# Patient Record
Sex: MALE | Race: WHITE | NOT HISPANIC OR LATINO | URBAN - METROPOLITAN AREA
[De-identification: names, ages, dates, MRNs, and addresses within clinical notes are randomized per-mention and may not be internally consistent; named-entity substitution may affect disease eponyms.]

---

## 2023-12-04 ENCOUNTER — TELEPHONE (OUTPATIENT)
Dept: CARDIOLOGY | Facility: CLINIC | Age: 73
End: 2023-12-04
Payer: COMMERCIAL

## 2023-12-04 NOTE — TELEPHONE ENCOUNTER
[employer] left message asking if his Rosuvastatin 5 mg was changed to 1 tablet 3 times a day.  If this is correct they would like office to send in new Rx sent in to [facility] pharmacy.    Please call [employer] [phone], ext [phone].    Routed to [facility].

## 2023-12-05 RX ORDER — CLONIDINE HYDROCHLORIDE 0.3 MG/1
0.3 TABLET ORAL 2 TIMES DAILY
COMMUNITY

## 2023-12-05 RX ORDER — NITROGLYCERIN 0.4 MG/1
0.4 TABLET SUBLINGUAL EVERY 5 MIN PRN
COMMUNITY

## 2023-12-05 RX ORDER — NAPROXEN SODIUM 220 MG/1
TABLET ORAL
COMMUNITY

## 2023-12-05 RX ORDER — ROSUVASTATIN CALCIUM 5 MG/1
5 TABLET, COATED ORAL EVERY OTHER DAY
COMMUNITY

## 2023-12-05 RX ORDER — INSULIN GLARGINE 100 [IU]/ML
INJECTION, SOLUTION SUBCUTANEOUS NIGHTLY
COMMUNITY

## 2023-12-05 RX ORDER — EZETIMIBE 10 MG/1
10 TABLET ORAL DAILY
COMMUNITY
End: 2024-02-01

## 2023-12-05 RX ORDER — FAMOTIDINE 40 MG/1
40 TABLET, FILM COATED ORAL DAILY
COMMUNITY

## 2023-12-05 RX ORDER — LABETALOL 300 MG/1
300 TABLET, FILM COATED ORAL 2 TIMES DAILY
COMMUNITY

## 2023-12-05 RX ORDER — CLOPIDOGREL BISULFATE 75 MG/1
75 TABLET ORAL DAILY
COMMUNITY

## 2023-12-05 RX ORDER — DAPAGLIFLOZIN 10 MG/1
10 TABLET, FILM COATED ORAL DAILY
COMMUNITY

## 2023-12-05 RX ORDER — LISINOPRIL AND HYDROCHLOROTHIAZIDE 10; 12.5 MG/1; MG/1
1 TABLET ORAL 2 TIMES DAILY
COMMUNITY

## 2023-12-05 RX ORDER — NIFEDIPINE 60 MG/1
60 TABLET, FILM COATED, EXTENDED RELEASE ORAL 2 TIMES DAILY
COMMUNITY

## 2023-12-05 RX ORDER — ASPIRIN 81 MG/1
81 TABLET ORAL DAILY
COMMUNITY

## 2023-12-05 NOTE — TELEPHONE ENCOUNTER
Patient states he is currently taking rosuvastatin 5 mg every other day due to symptoms and that Dr. Martinez Bailey MD was aware. Updated medication list to be corrected at next refills.

## 2024-01-29 DIAGNOSIS — E78.2 MIXED HYPERLIPIDEMIA: ICD-10-CM

## 2024-02-01 RX ORDER — EZETIMIBE 10 MG/1
10 TABLET ORAL NIGHTLY
Qty: 90 TABLET | Refills: 3 | Status: SHIPPED | OUTPATIENT
Start: 2024-02-01

## 2024-03-05 PROBLEM — E11.9 DIABETES MELLITUS (MULTI): Status: ACTIVE | Noted: 2024-03-05

## 2024-03-05 PROBLEM — R94.31 ABNORMAL EKG: Status: ACTIVE | Noted: 2024-03-05

## 2024-03-05 PROBLEM — Z98.61 HISTORY OF PTCA: Status: ACTIVE | Noted: 2024-03-05

## 2024-03-05 PROBLEM — K21.9 GERD (GASTROESOPHAGEAL REFLUX DISEASE): Status: ACTIVE | Noted: 2024-03-05

## 2024-03-05 PROBLEM — E78.5 HYPERLIPIDEMIA: Status: ACTIVE | Noted: 2024-03-05

## 2024-03-05 PROBLEM — I48.0 PAROXYSMAL ATRIAL FIBRILLATION (MULTI): Status: ACTIVE | Noted: 2024-03-05

## 2024-03-05 PROBLEM — I25.10 ATHEROSCLEROSIS OF NATIVE CORONARY ARTERY WITHOUT ANGINA PECTORIS: Status: ACTIVE | Noted: 2024-03-05

## 2024-03-05 PROBLEM — I10 BENIGN ESSENTIAL HYPERTENSION: Status: ACTIVE | Noted: 2024-03-05

## 2024-06-11 ENCOUNTER — OFFICE VISIT (OUTPATIENT)
Dept: CARDIOLOGY | Facility: CLINIC | Age: 74
End: 2024-06-11
Payer: COMMERCIAL

## 2024-06-11 VITALS
WEIGHT: 189.9 LBS | HEIGHT: 68 IN | SYSTOLIC BLOOD PRESSURE: 92 MMHG | HEART RATE: 58 BPM | BODY MASS INDEX: 28.78 KG/M2 | DIASTOLIC BLOOD PRESSURE: 54 MMHG

## 2024-06-11 DIAGNOSIS — I48.0 PAROXYSMAL ATRIAL FIBRILLATION (MULTI): Primary | ICD-10-CM

## 2024-06-11 DIAGNOSIS — Z98.61 HISTORY OF PTCA: ICD-10-CM

## 2024-06-11 DIAGNOSIS — I10 BENIGN ESSENTIAL HYPERTENSION: ICD-10-CM

## 2024-06-11 DIAGNOSIS — I25.118 CORONARY ARTERY DISEASE OF NATIVE ARTERY OF NATIVE HEART WITH STABLE ANGINA PECTORIS (CMS-HCC): ICD-10-CM

## 2024-06-11 DIAGNOSIS — Z87.891 FORMER CIGARETTE SMOKER: ICD-10-CM

## 2024-06-11 DIAGNOSIS — E78.2 MIXED HYPERLIPIDEMIA: ICD-10-CM

## 2024-06-11 PROCEDURE — 3074F SYST BP LT 130 MM HG: CPT | Performed by: INTERNAL MEDICINE

## 2024-06-11 PROCEDURE — 3078F DIAST BP <80 MM HG: CPT | Performed by: INTERNAL MEDICINE

## 2024-06-11 PROCEDURE — 1159F MED LIST DOCD IN RCRD: CPT | Performed by: INTERNAL MEDICINE

## 2024-06-11 PROCEDURE — 1036F TOBACCO NON-USER: CPT | Performed by: INTERNAL MEDICINE

## 2024-06-11 PROCEDURE — 99214 OFFICE O/P EST MOD 30 MIN: CPT | Performed by: INTERNAL MEDICINE

## 2024-06-11 RX ORDER — UBIDECARENONE 75 MG
500 CAPSULE ORAL DAILY
COMMUNITY

## 2024-06-11 RX ORDER — ISOSORBIDE MONONITRATE 30 MG/1
30 TABLET, EXTENDED RELEASE ORAL DAILY
Qty: 90 TABLET | Refills: 3 | Status: SHIPPED | OUTPATIENT
Start: 2024-06-11 | End: 2025-06-11

## 2024-06-11 NOTE — PROGRESS NOTES
"Mckenzie Jones is a 73 y.o. male       Chief Complaint    Annual Exam          HPI     Review of Systems   All other systems reviewed and are negative.     Patient returns in follow-up of problems as noted.  He states he is done well and has had no interval events.  He describes stable angina.  Detailed questioning ensued.  It sounds as if he has had stable angina for about 3 years.  It comes on her predictable workload and goes away with rest.  It will also go away promptly with a single nitroglycerin.  Prophylactic use of nitroglycerin prevents the episodes altogether.  I advised him this is all consistent with angina.    He recalls and/or remembers that he had coronary intervention about 4 years ago.  At that time there was a vessel that cannot be fixed and ever since his intervention he has basically had these anginal symptoms and because of this he presumes it to be on the basis of the vessel that was not amenable to PCI.  This is very possible.    He was educated on the concept of stable angina versus unstable angina.  He was advised that if he had any unstable symptomatology to seek emergent or prompt care.  We also suggested and offered the implementation of long-acting isosorbide mononitrate in hopes of attenuating his symptomatology and he will give it a try.  Otherwise he is happy and comfortable to continue as is.  He does not find the symptoms lifestyle limiting    In regards to other matters his blood pressure and lipids are adequately controlled.  He was congratulated on smoking cessation.  He has had no recent paroxysms of atrial fibrillation and because of this he will continue to monitor his rhythm and let us know if he develops any arrhythmia irregularities.  We did advocate the merits of weight loss as well.      Vitals:    06/11/24 0831   BP: 92/54   BP Location: Left arm   Patient Position: Sitting   Pulse: 58   Weight: 86.1 kg (189 lb 14.4 oz)   Height: 1.727 m (5' 8\")    "     Objective   Physical Exam  Constitutional:       Appearance: Normal appearance.   HENT:      Nose: Nose normal.   Neck:      Vascular: No carotid bruit.   Cardiovascular:      Rate and Rhythm: Normal rate.      Pulses: Normal pulses.      Heart sounds: Normal heart sounds.   Pulmonary:      Effort: Pulmonary effort is normal.   Abdominal:      General: Bowel sounds are normal.      Palpations: Abdomen is soft.   Musculoskeletal:         General: Normal range of motion.      Cervical back: Normal range of motion.      Right lower leg: No edema.      Left lower leg: No edema.   Skin:     General: Skin is warm and dry.   Neurological:      General: No focal deficit present.      Mental Status: He is alert.   Psychiatric:         Mood and Affect: Mood normal.         Behavior: Behavior normal.         Thought Content: Thought content normal.         Judgment: Judgment normal.         Allergies  Terbinafine     Current Medications    Current Outpatient Medications:     aspirin 81 mg EC tablet, Take 1 tablet (81 mg) by mouth once daily., Disp: , Rfl:     cloNIDine (Catapres) 0.3 mg tablet, Take 1 tablet (0.3 mg) by mouth once daily at bedtime., Disp: , Rfl:     clopidogrel (Plavix) 75 mg tablet, Take 1 tablet (75 mg) by mouth once daily., Disp: , Rfl:     cyanocobalamin (Vitamin B-12) 500 mcg tablet, Take 1 tablet (500 mcg) by mouth once daily., Disp: , Rfl:     dapagliflozin propanediol (Farxiga) 10 mg, Take 1 tablet (10 mg) by mouth once daily., Disp: , Rfl:     ezetimibe (Zetia) 10 mg tablet, TAKE 1 TABLET BY MOUTH AT BEDTIME, Disp: 90 tablet, Rfl: 3    insulin glargine (Basaglar KwikPen U-100 Insulin) 100 unit/mL (3 mL) pen, Inject under the skin once daily at bedtime. Take as directed per insulin instructions., Disp: , Rfl:     labetalol (Normodyne) 300 mg tablet, Take 1 tablet (300 mg) by mouth 2 times a day., Disp: , Rfl:     lisinopriL-hydrochlorothiazide 10-12.5 mg tablet, Take 1 tablet by mouth 2 times a  day., Disp: , Rfl:     NIFEdipine ER (Adalat CC) 60 mg 24 hr tablet, Take 1 tablet (60 mg) by mouth 2 times a day. Do not crush, chew, or split., Disp: , Rfl:     nitroglycerin (Nitrostat) 0.4 mg SL tablet, Place 1 tablet (0.4 mg) under the tongue every 5 minutes if needed for chest pain., Disp: , Rfl:     omega 3-dha-epa-fish oil (Fish OiL) 1,200 (144-216) mg capsule, Take by mouth., Disp: , Rfl:     rosuvastatin (Crestor) 5 mg tablet, Take 1 tablet (5 mg) by mouth. Take one tablet by mouth 3 times a week, Disp: , Rfl:                      Assessment/Plan   1. Paroxysmal atrial fibrillation (Multi)  No recurrence.  Patient understands how to watch and monitor for atrial fibrillation    2. History of PTCA  Patient had intervention about 4 years ago.  It improved his symptoms but not completely.  He has basically had stable angina ever since due to a vessel that cannot be intervened upon    3. Mixed hyperlipidemia  Review of treatment strategy demonstrates good control    4. Benign essential hypertension  Review of treatment strategy demonstrates good control    5. Coronary artery disease of native artery of native heart with stable angina pectoris (CMS-HCC)  As above, patient has stable coronary disease and stable angina pectoris.    6. Former cigarette smoker  Congratulated on cessation accomplishment           Scribe Attestation  By signing my name below, IYen LPN, Scribe   attest that this documentation has been prepared under the direction and in the presence of Martinez Bailey MD.     Provider Attestation - Scribe documentation    All medical record entries made by the Scribe were at my direction and personally dictated by me. I have reviewed the chart and agree that the record accurately reflects my personal performance of the history, physical exam, discussion and plan.

## 2024-06-11 NOTE — LETTER
June 11, 2024     Jad Davis MD  [address]  [facility] [address]    Patient: Abdelrahman Jones   YOB: 1950   Date of Visit: 6/11/2024       Dear Dr. Jad Davis MD:    Thank you for referring Abdelrahman Jones to me for evaluation. Below are my notes for this consultation.  If you have questions, please do not hesitate to call me. I look forward to following your patient along with you.       Sincerely,     Martinez Bailey MD      CC: No Recipients  ______________________________________________________________________________________    Subjective   Abdelrahman Jones is a 73 y.o. male       Chief Complaint    Annual Exam          HPI     Review of Systems   All other systems reviewed and are negative.     Patient returns in follow-up of problems as noted.  He states he is done well and has had no interval events.  He describes stable angina.  Detailed questioning ensued.  It sounds as if he has had stable angina for about 3 years.  It comes on her predictable workload and goes away with rest.  It will also go away promptly with a single nitroglycerin.  Prophylactic use of nitroglycerin prevents the episodes altogether.  I advised him this is all consistent with angina.    He recalls and/or remembers that he had coronary intervention about 4 years ago.  At that time there was a vessel that cannot be fixed and ever since his intervention he has basically had these anginal symptoms and because of this he presumes it to be on the basis of the vessel that was not amenable to PCI.  This is very possible.    He was educated on the concept of stable angina versus unstable angina.  He was advised that if he had any unstable symptomatology to seek emergent or prompt care.  We also suggested and offered the implementation of long-acting isosorbide mononitrate in hopes of attenuating his symptomatology and he will give it a try.  Otherwise he is happy and comfortable to continue as is.  He does  "not find the symptoms lifestyle limiting    In regards to other matters his blood pressure and lipids are adequately controlled.  He was congratulated on smoking cessation.  He has had no recent paroxysms of atrial fibrillation and because of this he will continue to monitor his rhythm and let us know if he develops any arrhythmia irregularities.  We did advocate the merits of weight loss as well.      Vitals:    06/11/24 0831   BP: 92/54   BP Location: Left arm   Patient Position: Sitting   Pulse: 58   Weight: 86.1 kg (189 lb 14.4 oz)   Height: 1.727 m (5' 8\")        Objective   Physical Exam  Constitutional:       Appearance: Normal appearance.   HENT:      Nose: Nose normal.   Neck:      Vascular: No carotid bruit.   Cardiovascular:      Rate and Rhythm: Normal rate.      Pulses: Normal pulses.      Heart sounds: Normal heart sounds.   Pulmonary:      Effort: Pulmonary effort is normal.   Abdominal:      General: Bowel sounds are normal.      Palpations: Abdomen is soft.   Musculoskeletal:         General: Normal range of motion.      Cervical back: Normal range of motion.      Right lower leg: No edema.      Left lower leg: No edema.   Skin:     General: Skin is warm and dry.   Neurological:      General: No focal deficit present.      Mental Status: He is alert.   Psychiatric:         Mood and Affect: Mood normal.         Behavior: Behavior normal.         Thought Content: Thought content normal.         Judgment: Judgment normal.         Allergies  Terbinafine     Current Medications    Current Outpatient Medications:   •  aspirin 81 mg EC tablet, Take 1 tablet (81 mg) by mouth once daily., Disp: , Rfl:   •  cloNIDine (Catapres) 0.3 mg tablet, Take 1 tablet (0.3 mg) by mouth once daily at bedtime., Disp: , Rfl:   •  clopidogrel (Plavix) 75 mg tablet, Take 1 tablet (75 mg) by mouth once daily., Disp: , Rfl:   •  cyanocobalamin (Vitamin B-12) 500 mcg tablet, Take 1 tablet (500 mcg) by mouth once daily., Disp: , " Rfl:   •  dapagliflozin propanediol (Farxiga) 10 mg, Take 1 tablet (10 mg) by mouth once daily., Disp: , Rfl:   •  ezetimibe (Zetia) 10 mg tablet, TAKE 1 TABLET BY MOUTH AT BEDTIME, Disp: 90 tablet, Rfl: 3  •  insulin glargine (Basaglar KwikPen U-100 Insulin) 100 unit/mL (3 mL) pen, Inject under the skin once daily at bedtime. Take as directed per insulin instructions., Disp: , Rfl:   •  labetalol (Normodyne) 300 mg tablet, Take 1 tablet (300 mg) by mouth 2 times a day., Disp: , Rfl:   •  lisinopriL-hydrochlorothiazide 10-12.5 mg tablet, Take 1 tablet by mouth 2 times a day., Disp: , Rfl:   •  NIFEdipine ER (Adalat CC) 60 mg 24 hr tablet, Take 1 tablet (60 mg) by mouth 2 times a day. Do not crush, chew, or split., Disp: , Rfl:   •  nitroglycerin (Nitrostat) 0.4 mg SL tablet, Place 1 tablet (0.4 mg) under the tongue every 5 minutes if needed for chest pain., Disp: , Rfl:   •  omega 3-dha-epa-fish oil (Fish OiL) 1,200 (144-216) mg capsule, Take by mouth., Disp: , Rfl:   •  rosuvastatin (Crestor) 5 mg tablet, Take 1 tablet (5 mg) by mouth. Take one tablet by mouth 3 times a week, Disp: , Rfl:                      Assessment/Plan   1. Paroxysmal atrial fibrillation (Multi)  No recurrence.  Patient understands how to watch and monitor for atrial fibrillation    2. History of PTCA  Patient had intervention about 4 years ago.  It improved his symptoms but not completely.  He has basically had stable angina ever since due to a vessel that cannot be intervened upon    3. Mixed hyperlipidemia  Review of treatment strategy demonstrates good control    4. Benign essential hypertension  Review of treatment strategy demonstrates good control    5. Coronary artery disease of native artery of native heart with stable angina pectoris (CMS-HCC)  As above, patient has stable coronary disease and stable angina pectoris.    6. Former cigarette smoker  Congratulated on cessation accomplishment           Scribe Attestation  By signing my  name below, I, David Sepulveda LPN   attest that this documentation has been prepared under the direction and in the presence of Martinez Bailey MD.     Provider Attestation - Scribe documentation    All medical record entries made by the Scribe were at my direction and personally dictated by me. I have reviewed the chart and agree that the record accurately reflects my personal performance of the history, physical exam, discussion and plan.

## 2024-06-11 NOTE — PATIENT INSTRUCTIONS
Please bring all medicines, vitamins, and herbal supplements with you when you come to the office.    Prescriptions will not be filled unless you are compliant with your follow up appointments or have a follow up appointment scheduled as per instruction of your physician. Refills should be requested at the time of your visit.     BMI was above normal measurement. Current weight: 86.1 kg (189 lb 14.4 oz)  Weight change since last visit (-) denotes wt loss -0.1 lbs   Weight loss needed to achieve BMI 25: 25.8 Lbs  Weight loss needed to achieve BMI 30: -7 Lbs  Provided instructions on dietary changes  Provided instructions on exercise.

## 2024-07-02 DIAGNOSIS — E78.2 MIXED HYPERLIPIDEMIA: ICD-10-CM

## 2024-07-03 RX ORDER — ROSUVASTATIN CALCIUM 5 MG/1
5 TABLET, COATED ORAL NIGHTLY
Qty: 90 TABLET | Refills: 3 | Status: SHIPPED | OUTPATIENT
Start: 2024-07-03

## 2025-01-09 ENCOUNTER — APPOINTMENT (OUTPATIENT)
Dept: CARDIOLOGY | Facility: CLINIC | Age: 75
End: 2025-01-09
Payer: COMMERCIAL

## 2025-01-09 VITALS
SYSTOLIC BLOOD PRESSURE: 128 MMHG | DIASTOLIC BLOOD PRESSURE: 64 MMHG | WEIGHT: 184.6 LBS | BODY MASS INDEX: 27.98 KG/M2 | HEART RATE: 58 BPM | HEIGHT: 68 IN

## 2025-01-09 DIAGNOSIS — Z98.61 HISTORY OF PTCA: ICD-10-CM

## 2025-01-09 DIAGNOSIS — I25.10 ASHD (ARTERIOSCLEROTIC HEART DISEASE): ICD-10-CM

## 2025-01-09 DIAGNOSIS — I25.118 CORONARY ARTERY DISEASE OF NATIVE ARTERY OF NATIVE HEART WITH STABLE ANGINA PECTORIS: ICD-10-CM

## 2025-01-09 DIAGNOSIS — I48.0 PAROXYSMAL ATRIAL FIBRILLATION (MULTI): ICD-10-CM

## 2025-01-09 DIAGNOSIS — E78.2 MIXED HYPERLIPIDEMIA: ICD-10-CM

## 2025-01-09 DIAGNOSIS — Z87.891 FORMER CIGARETTE SMOKER: ICD-10-CM

## 2025-01-09 DIAGNOSIS — I20.9 ANGINA, CLASS II (CMS-HCC): ICD-10-CM

## 2025-01-09 DIAGNOSIS — I10 ESSENTIAL HYPERTENSION: ICD-10-CM

## 2025-01-09 PROCEDURE — 1159F MED LIST DOCD IN RCRD: CPT | Performed by: INTERNAL MEDICINE

## 2025-01-09 PROCEDURE — G2211 COMPLEX E/M VISIT ADD ON: HCPCS | Performed by: INTERNAL MEDICINE

## 2025-01-09 PROCEDURE — 3078F DIAST BP <80 MM HG: CPT | Performed by: INTERNAL MEDICINE

## 2025-01-09 PROCEDURE — 1036F TOBACCO NON-USER: CPT | Performed by: INTERNAL MEDICINE

## 2025-01-09 PROCEDURE — 1160F RVW MEDS BY RX/DR IN RCRD: CPT | Performed by: INTERNAL MEDICINE

## 2025-01-09 PROCEDURE — 3008F BODY MASS INDEX DOCD: CPT | Performed by: INTERNAL MEDICINE

## 2025-01-09 PROCEDURE — 99214 OFFICE O/P EST MOD 30 MIN: CPT | Performed by: INTERNAL MEDICINE

## 2025-01-09 PROCEDURE — 3074F SYST BP LT 130 MM HG: CPT | Performed by: INTERNAL MEDICINE

## 2025-01-09 RX ORDER — POLYETHYLENE GLYCOL 3350 17 G/17G
17 POWDER, FOR SOLUTION ORAL DAILY
COMMUNITY

## 2025-01-09 RX ORDER — NIFEDIPINE 30 MG/1
30 TABLET, EXTENDED RELEASE ORAL DAILY
COMMUNITY
Start: 2024-12-30

## 2025-01-09 NOTE — LETTER
January 9, 2025     Jad Davis MD  [address]  [facility] [address]    Patient: Abdelrahman Jones   YOB: 1950   Date of Visit: 1/9/2025       Dear Dr. Jad Davis MD:    Thank you for referring Abdelrahman Jones to me for evaluation. Below are my notes for this consultation.  If you have questions, please do not hesitate to call me. I look forward to following your patient along with you.       Sincerely,     Martinez Santamaria, DO      CC: No Recipients  ______________________________________________________________________________________    Subjective   Abdelrahman Jones is a 74 y.o. male       Chief Complaint    Follow-up          74-year-old gentleman returns for follow-up, former patient of Dr. Bailey's now I am assuming his cardiovascular care during his [facility].  He is doing reasonably well, he does have class II angina with exertion, notably at approximately 8 to 10 minutes on the treadmill, relieved with rest and occasionally using nitroglycerin.  This anginal complaint has not changed over the past several years.    He has known ASHD with multivessel revascularization of the LAD, proximal RCA performed by myself prior to 2019 subsequently in 2019, had ACS event and underwent PCI of the PLV branch; at that time LAD and RCA stents were widely patent.  Notably he has subtotal occlusion distal circumflex that we have avoided, and treated conservatively (this is likely is his culprit vessel).    He remains on DAPT, long-acting nitrates as well as calcium antagonist, low-dose statin including Zetia, as well as labetalol, clonidine and nifedipine.    His diabetes is under reasonable control his last A1c was 6.6%, last LDL from April 2024 was 37.    As per Dr. Bailey's last note as well as today we reiterated stable versus unstable anginal complaints and went to proceed with further urgent/emergent evaluation if necessary.  He is clearly stable and I would grade his angina  "classification II.    Detailed review of last stress imaging, last angiogram and intervention from 2019 as well as Dr. Bailey's clinical notes.    Recommendations at this time are to proceed with stress perfusion imaging within the next 6 months, continue current therapies, obtain lipid panel and high-sensitivity CRP and follow-up in 6 months         Review of Systems   All other systems reviewed and are negative.           Vitals:    01/09/25 1319   BP: 128/64   BP Location: Left arm   Patient Position: Sitting   Pulse: 58   Weight: 83.7 kg (184 lb 9.6 oz)   Height: 1.727 m (5' 8\")        Objective   Physical Exam  Constitutional:       Appearance: Normal appearance.   HENT:      Nose: Nose normal.   Neck:      Vascular: No carotid bruit.   Cardiovascular:      Rate and Rhythm: Normal rate.      Pulses: Normal pulses.      Heart sounds: Normal heart sounds.   Pulmonary:      Effort: Pulmonary effort is normal.   Abdominal:      General: Bowel sounds are normal.      Palpations: Abdomen is soft.   Musculoskeletal:         General: Normal range of motion.      Cervical back: Normal range of motion.      Right lower leg: No edema.      Left lower leg: No edema.   Skin:     General: Skin is warm and dry.   Neurological:      General: No focal deficit present.      Mental Status: He is alert.   Psychiatric:         Mood and Affect: Mood normal.         Behavior: Behavior normal.         Thought Content: Thought content normal.         Judgment: Judgment normal.         Allergies  Terbinafine     Current Medications    Current Outpatient Medications:   •  aspirin 81 mg EC tablet, Take 1 tablet (81 mg) by mouth once daily., Disp: , Rfl:   •  cloNIDine (Catapres) 0.3 mg tablet, Take 1 tablet (0.3 mg) by mouth once daily at bedtime., Disp: , Rfl:   •  clopidogrel (Plavix) 75 mg tablet, Take 1 tablet (75 mg) by mouth once daily., Disp: , Rfl:   •  cyanocobalamin (Vitamin B-12) 500 mcg tablet, Take 1 tablet (500 mcg) by " mouth once daily., Disp: , Rfl:   •  dapagliflozin propanediol (Farxiga) 10 mg, Take 1 tablet (10 mg) by mouth once daily., Disp: , Rfl:   •  ezetimibe (Zetia) 10 mg tablet, TAKE 1 TABLET BY MOUTH AT BEDTIME, Disp: 90 tablet, Rfl: 3  •  insulin glargine (Basaglar KwikPen U-100 Insulin) 100 unit/mL (3 mL) pen, Inject under the skin once daily at bedtime. Take as directed per insulin instructions., Disp: , Rfl:   •  isosorbide mononitrate ER (Imdur) 30 mg 24 hr tablet, Take 1 tablet (30 mg) by mouth once daily. Do not crush or chew., Disp: 90 tablet, Rfl: 3  •  labetalol (Normodyne) 300 mg tablet, Take 1 tablet (300 mg) by mouth 2 times a day., Disp: , Rfl:   •  lisinopriL-hydrochlorothiazide 10-12.5 mg tablet, Take 1 tablet by mouth 2 times a day., Disp: , Rfl:   •  NIFEdipine CC 30 mg 24 hr tablet, Take 1 tablet (30 mg) by mouth once daily. Do not crush, chew, or split, Disp: , Rfl:   •  nitroglycerin (Nitrostat) 0.4 mg SL tablet, Place 1 tablet (0.4 mg) under the tongue every 5 minutes if needed for chest pain., Disp: , Rfl:   •  omega 3-dha-epa-fish oil (Fish OiL) 1,200 (144-216) mg capsule, Take by mouth., Disp: , Rfl:   •  polyethylene glycol (Glycolax, Miralax) 17 gram packet, Take 17 g by mouth once daily., Disp: , Rfl:   •  rosuvastatin (Crestor) 5 mg tablet, TAKE 1 TABLET BY MOUTH AT BEDTIME, Disp: 90 tablet, Rfl: 3                     Assessment/Plan   1. Coronary artery disease of native artery of native heart with stable angina pectoris  Follow Up In Cardiology      2. History of PTCA        3. Paroxysmal atrial fibrillation (Multi)        4. Essential hypertension        5. Mixed hyperlipidemia        6. Former cigarette smoker        7. Angina, class II (CMS-HCC)                 Scribe Attestation  By signing my name below, Tari NGUYEN LPN  , Scribe   attest that this documentation has been prepared under the direction and in the presence of Martinez Santamaria DO.     Provider Attestation - Scribe  documentation    All medical record entries made by the Scribe were at my direction and personally dictated by me. I have reviewed the chart and agree that the record accurately reflects my personal performance of the history, physical exam, discussion and plan.

## 2025-01-09 NOTE — PATIENT INSTRUCTIONS
Please bring all medicines, vitamins, and herbal supplements with you when you come to the office.    Prescriptions will not be filled unless you are compliant with your follow up appointments or have a follow up appointment scheduled as per instruction of your physician. Refills should be requested at the time of your visit.     BMI was above normal measurement. Current weight: 83.7 kg (184 lb 9.6 oz)  Weight change since last visit (-) denotes wt loss -5.3 lbs   Weight loss needed to achieve BMI 25: 20.5 Lbs  Weight loss needed to achieve BMI 30: -12.3 Lbs  Provided instructions on dietary changes  Provided instructions on exercise.

## 2025-01-09 NOTE — PROGRESS NOTES
Subjective   Abdelrahman Jones is a 74 y.o. male       Chief Complaint    Follow-up          74-year-old gentleman returns for follow-up, former patient of Dr. Bailey's now I am assuming his cardiovascular care during his intermediate.  He is doing reasonably well, he does have class II angina with exertion, notably at approximately 8 to 10 minutes on the treadmill, relieved with rest and occasionally using nitroglycerin.  This anginal complaint has not changed over the past several years.    He has known ASHD with multivessel revascularization of the LAD, proximal RCA performed by myself prior to 2019 subsequently in 2019, had ACS event and underwent PCI of the PLV branch; at that time LAD and RCA stents were widely patent.  Notably he has subtotal occlusion distal circumflex that we have avoided, and treated conservatively (this is likely is his culprit vessel).    He remains on DAPT, long-acting nitrates as well as calcium antagonist, low-dose statin including Zetia, as well as labetalol, clonidine and nifedipine.    His diabetes is under reasonable control his last A1c was 6.6%, last LDL from April 2024 was 37.    As per Dr. Bailey's last note as well as today we reiterated stable versus unstable anginal complaints and went to proceed with further urgent/emergent evaluation if necessary.  He is clearly stable and I would grade his angina classification II.    Detailed review of last stress imaging, last angiogram and intervention from 2019 as well as Dr. Bailey's clinical notes.    Recommendations at this time are to proceed with stress perfusion imaging within the next 6 months, continue current therapies, obtain lipid panel and high-sensitivity CRP and follow-up in 6 months         Review of Systems   All other systems reviewed and are negative.           Vitals:    01/09/25 1319   BP: 128/64   BP Location: Left arm   Patient Position: Sitting   Pulse: 58   Weight: 83.7 kg (184 lb 9.6 oz)   Height: 1.727 m (5'  "8\")        Objective   Physical Exam  Constitutional:       Appearance: Normal appearance.   HENT:      Nose: Nose normal.   Neck:      Vascular: No carotid bruit.   Cardiovascular:      Rate and Rhythm: Normal rate.      Pulses: Normal pulses.      Heart sounds: Normal heart sounds.   Pulmonary:      Effort: Pulmonary effort is normal.   Abdominal:      General: Bowel sounds are normal.      Palpations: Abdomen is soft.   Musculoskeletal:         General: Normal range of motion.      Cervical back: Normal range of motion.      Right lower leg: No edema.      Left lower leg: No edema.   Skin:     General: Skin is warm and dry.   Neurological:      General: No focal deficit present.      Mental Status: He is alert.   Psychiatric:         Mood and Affect: Mood normal.         Behavior: Behavior normal.         Thought Content: Thought content normal.         Judgment: Judgment normal.         Allergies  Terbinafine     Current Medications    Current Outpatient Medications:     aspirin 81 mg EC tablet, Take 1 tablet (81 mg) by mouth once daily., Disp: , Rfl:     cloNIDine (Catapres) 0.3 mg tablet, Take 1 tablet (0.3 mg) by mouth once daily at bedtime., Disp: , Rfl:     clopidogrel (Plavix) 75 mg tablet, Take 1 tablet (75 mg) by mouth once daily., Disp: , Rfl:     cyanocobalamin (Vitamin B-12) 500 mcg tablet, Take 1 tablet (500 mcg) by mouth once daily., Disp: , Rfl:     dapagliflozin propanediol (Farxiga) 10 mg, Take 1 tablet (10 mg) by mouth once daily., Disp: , Rfl:     ezetimibe (Zetia) 10 mg tablet, TAKE 1 TABLET BY MOUTH AT BEDTIME, Disp: 90 tablet, Rfl: 3    insulin glargine (Basaglar KwikPen U-100 Insulin) 100 unit/mL (3 mL) pen, Inject under the skin once daily at bedtime. Take as directed per insulin instructions., Disp: , Rfl:     isosorbide mononitrate ER (Imdur) 30 mg 24 hr tablet, Take 1 tablet (30 mg) by mouth once daily. Do not crush or chew., Disp: 90 tablet, Rfl: 3    labetalol (Normodyne) 300 mg " tablet, Take 1 tablet (300 mg) by mouth 2 times a day., Disp: , Rfl:     lisinopriL-hydrochlorothiazide 10-12.5 mg tablet, Take 1 tablet by mouth 2 times a day., Disp: , Rfl:     NIFEdipine CC 30 mg 24 hr tablet, Take 1 tablet (30 mg) by mouth once daily. Do not crush, chew, or split, Disp: , Rfl:     nitroglycerin (Nitrostat) 0.4 mg SL tablet, Place 1 tablet (0.4 mg) under the tongue every 5 minutes if needed for chest pain., Disp: , Rfl:     omega 3-dha-epa-fish oil (Fish OiL) 1,200 (144-216) mg capsule, Take by mouth., Disp: , Rfl:     polyethylene glycol (Glycolax, Miralax) 17 gram packet, Take 17 g by mouth once daily., Disp: , Rfl:     rosuvastatin (Crestor) 5 mg tablet, TAKE 1 TABLET BY MOUTH AT BEDTIME, Disp: 90 tablet, Rfl: 3                     Assessment/Plan   1. Coronary artery disease of native artery of native heart with stable angina pectoris  Follow Up In Cardiology      2. History of PTCA        3. Paroxysmal atrial fibrillation (Multi)        4. Essential hypertension        5. Mixed hyperlipidemia        6. Former cigarette smoker        7. Angina, class II (CMS-HCC)                 Scribe Attestation  By signing my name below, ITari LPN, Scribe   attest that this documentation has been prepared under the direction and in the presence of Martinez Santamaria DO.     Provider Attestation - Scribe documentation    All medical record entries made by the Scribe were at my direction and personally dictated by me. I have reviewed the chart and agree that the record accurately reflects my personal performance of the history, physical exam, discussion and plan.

## 2025-01-29 ENCOUNTER — HOSPITAL ENCOUNTER (OUTPATIENT)
Dept: RADIOLOGY | Facility: CLINIC | Age: 75
Discharge: HOME | End: 2025-01-29
Payer: COMMERCIAL

## 2025-01-29 ENCOUNTER — HOSPITAL ENCOUNTER (OUTPATIENT)
Dept: CARDIOLOGY | Facility: CLINIC | Age: 75
Discharge: HOME | End: 2025-01-29
Payer: COMMERCIAL

## 2025-01-29 VITALS — HEART RATE: 64 BPM | SYSTOLIC BLOOD PRESSURE: 138 MMHG | DIASTOLIC BLOOD PRESSURE: 78 MMHG

## 2025-01-29 DIAGNOSIS — I20.9 ANGINA, CLASS II (CMS-HCC): ICD-10-CM

## 2025-01-29 DIAGNOSIS — Z98.61 HISTORY OF PTCA: ICD-10-CM

## 2025-01-29 DIAGNOSIS — I25.118 CORONARY ARTERY DISEASE OF NATIVE ARTERY OF NATIVE HEART WITH STABLE ANGINA PECTORIS: ICD-10-CM

## 2025-01-29 DIAGNOSIS — I10 ESSENTIAL HYPERTENSION: ICD-10-CM

## 2025-01-29 DIAGNOSIS — Z87.891 FORMER CIGARETTE SMOKER: ICD-10-CM

## 2025-01-29 PROCEDURE — A9502 TC99M TETROFOSMIN: HCPCS | Performed by: INTERNAL MEDICINE

## 2025-01-29 PROCEDURE — 78452 HT MUSCLE IMAGE SPECT MULT: CPT | Performed by: INTERNAL MEDICINE

## 2025-01-29 PROCEDURE — 93018 CV STRESS TEST I&R ONLY: CPT | Performed by: INTERNAL MEDICINE

## 2025-01-29 PROCEDURE — 3430000001 HC RX 343 DIAGNOSTIC RADIOPHARMACEUTICALS: Performed by: INTERNAL MEDICINE

## 2025-01-29 PROCEDURE — 78452 HT MUSCLE IMAGE SPECT MULT: CPT

## 2025-01-29 PROCEDURE — 93017 CV STRESS TEST TRACING ONLY: CPT

## 2025-01-29 PROCEDURE — 93016 CV STRESS TEST SUPVJ ONLY: CPT | Performed by: INTERNAL MEDICINE

## 2025-01-29 PROCEDURE — 2500000004 HC RX 250 GENERAL PHARMACY W/ HCPCS (ALT 636 FOR OP/ED): Performed by: INTERNAL MEDICINE

## 2025-01-29 RX ORDER — REGADENOSON 0.08 MG/ML
0.4 INJECTION, SOLUTION INTRAVENOUS ONCE
Status: COMPLETED | OUTPATIENT
Start: 2025-01-29 | End: 2025-01-29

## 2025-01-29 RX ADMIN — REGADENOSON 0.4 MG: 0.08 INJECTION, SOLUTION INTRAVENOUS at 09:40

## 2025-01-29 RX ADMIN — TETROFOSMIN 34.4 MILLICURIE: 0.23 INJECTION, POWDER, LYOPHILIZED, FOR SOLUTION INTRAVENOUS at 09:41

## 2025-01-29 RX ADMIN — TETROFOSMIN 11.1 MILLICURIE: 0.23 INJECTION, POWDER, LYOPHILIZED, FOR SOLUTION INTRAVENOUS at 08:28

## 2025-01-30 ENCOUNTER — TELEPHONE (OUTPATIENT)
Dept: CARDIOLOGY | Facility: CLINIC | Age: 75
End: 2025-01-30
Payer: COMMERCIAL

## 2025-01-30 NOTE — TELEPHONE ENCOUNTER
Result Communication    Resulted Orders   Nuclear Stress Test    Narrative    Interpreted By:  Kenneth Hinton and Giannuzzi Michael   STUDY:  MYOCARDIAL PERFUSION STRESS TEST WITH EXERCISE CONVERTED TO LEXISCAN      Performing facility:  [facility],  [address] [place] [address], [address],  [place] [address]  [facility] Provider:  JOYCE Santamaria DO, FACC  PCP:  Dr. SHERITA Davis  Supervising provider:  JOYCE Santamaria DO, FACC      INDICATION:  CAD;  Hx PTCA  HTN  Angina pectoris      HISTORY:  Gender:  M; Age:  75 y/o ; Height:  .7 cm cm; Weight:   WT  83.734 kg kg.      CAD;  High Cholesterol;  HTN;  Arrhythmias; A-fib  Chest Pain;  Quit smoking 47 years ago.      Cardiac catheterization on 2017, 2018, 2019.  PTCA on 2017, 2018,2019.      COMPARISON:  Previous nuclear testing completed on2021 at [facility].          ACCESSION NUMBER(S):  [id number]      ORDERING CLINICIAN:  GENEVA SANTAMARIA      TECHNIQUE:  ONE DAY protocol.  Stress injection: Date:  1-29-25, 34.4mCii of Myoview IV at 20  seconds after rapid injection of Lexiscan. Rest injection: Date:  1-29-25, 11.1mCi of Myoview IV at rest. The patient had a rapid  injection of 0.4mg of Lexiscan IV over 10 seconds. Imaging was  performed by  gated tomographic technique.          STRESS TEST DATA:  Resting heart rate was 64 BPM.  Resting blood pressure was 138/78 mmHg.  The patient exercised using a  Cooper exercise protocol.  6:46 minutes exercised.  80% of MPHR achieved for age.  8.10 METS achieved.  Maximum heart rate was 118 BPM.  Maximum blood pressure was 164/78 mmHg.  DTS 6.      TREADMILL TEST TERMINATED DUE TO: Dyspnea, leg weakness.  Test converted to Lexiscan.      TEST TERMINATED DUE TO:  Protocol completed.      FINDINGS:  STRESS TEST RESULTS:      Resting electrocardiogram revealed normal sinus rhythm nonspecific  ST-T changes. The patient had none diagnostic ECG changes with  maximal stress. The patient did not have chest  pains/symptoms during  the procedure. There was a normal recovery phase.  There were no significant dysrhythmias.  Patient did not achieve 85% MPHR so test was immediately converted to  Lexiscan.      LEXISCAN INFUSION:      The patient had a rapid injection of 0.4 mg of Lexiscan IV over 10  seconds. Resting electrocardiogram revealed normal sinus rhythm with  nonspecific ST-T changes. The patient had no significant ECG changes  with maximal stress. The patient did not have chest pains/symptoms  during the procedure. There was a normal recovery phase.  There were no significant dysrhythmias.      IMAGING RESULTS:      Image quality was good.  Rest and stress tomographic images were reviewed and revealed normal  perfusion without evidence of ischemia, myocardial infarction, or  left ventricular dilatation with stress. Overall left ventricular  systolic function appeared to be normal without regional wall motion  abnormalities. LV ejection fraction was 59 %.  TID is 1.2 and is normal.  There were evidence of diaphragmatic attenuation artifact.        Impression    Normal Lexiscan Myoview cardiac perfusion imaging stress test.  No evidence of ischemia or myocardial infarction by perfusion imaging.  Normal left ventricular systolic function, ejection fraction 59 %.  No exercise provoked significant ischemic ECG changes or chest pain  symptoms. Patient was able to exercise for 6 minute 46 seconds  achieving only 80% of maximum predicted heart rate and workload of  9.1 Mets. Because of sub optimal heart rate response to exercise the  patient was switched to Lexiscan to improve the sensitivity of the  test. No change when compared to previous study          Signed by: Kenneth Hinton 1/29/2025 5:57 PM  Dictation workstation:   [id number]       11:06 AM      Results were successfully communicated with the patient and they acknowledged their understanding.

## 2025-01-30 NOTE — TELEPHONE ENCOUNTER
----- Message from Martinez Santamaria sent at 1/30/2025 10:50 AM EST -----  No new orders. Please call patient with normal result.

## 2025-04-25 LAB
NON-UH HIE ALANINE AMINOTRANSFERASE: 25 U/L (ref 7–52)
NON-UH HIE ASPARTATE AMINO TRANSFERASE: 22 U/L (ref 13–39)
NON-UH HIE CHOL/HDL RATIO: 2.4
NON-UH HIE CHOLESTEROL: 87 MG/DL (ref 140–200)
NON-UH HIE HDL CHOLESTEROL: 36 MG/DL (ref 23–92)
NON-UH HIE HIGH SENSITIVE CRP: 3.5 MG/L (ref 0–0.9)
NON-UH HIE LDL CHOLESTEROL,CALCULATED: 21 MG/DL (ref 0–100)
NON-UH HIE TRIGLYCERIDE W/REFLEX: 151 MG/DL (ref 0–149)
NON-UH HIE VLDL CHOLESTEROL: 30 MG/DL

## 2025-07-09 ENCOUNTER — APPOINTMENT (OUTPATIENT)
Dept: CARDIOLOGY | Facility: CLINIC | Age: 75
End: 2025-07-09
Payer: COMMERCIAL

## 2025-07-09 VITALS
DIASTOLIC BLOOD PRESSURE: 51 MMHG | BODY MASS INDEX: 28.19 KG/M2 | SYSTOLIC BLOOD PRESSURE: 112 MMHG | HEART RATE: 60 BPM | WEIGHT: 186 LBS | HEIGHT: 68 IN

## 2025-07-09 DIAGNOSIS — Z87.891 FORMER CIGARETTE SMOKER: ICD-10-CM

## 2025-07-09 DIAGNOSIS — I48.0 PAROXYSMAL ATRIAL FIBRILLATION (MULTI): ICD-10-CM

## 2025-07-09 DIAGNOSIS — I20.9 ANGINA, CLASS II: ICD-10-CM

## 2025-07-09 DIAGNOSIS — Z98.61 HISTORY OF PTCA: ICD-10-CM

## 2025-07-09 DIAGNOSIS — I10 ESSENTIAL HYPERTENSION: ICD-10-CM

## 2025-07-09 DIAGNOSIS — I25.118 CORONARY ARTERY DISEASE OF NATIVE ARTERY OF NATIVE HEART WITH STABLE ANGINA PECTORIS: ICD-10-CM

## 2025-07-09 DIAGNOSIS — E78.2 MIXED HYPERLIPIDEMIA: ICD-10-CM

## 2025-07-09 DIAGNOSIS — E11.9 TYPE 2 DIABETES MELLITUS WITHOUT COMPLICATION, UNSPECIFIED WHETHER LONG TERM INSULIN USE: ICD-10-CM

## 2025-07-09 PROCEDURE — 3074F SYST BP LT 130 MM HG: CPT | Performed by: INTERNAL MEDICINE

## 2025-07-09 PROCEDURE — 1036F TOBACCO NON-USER: CPT | Performed by: INTERNAL MEDICINE

## 2025-07-09 PROCEDURE — 1160F RVW MEDS BY RX/DR IN RCRD: CPT | Performed by: INTERNAL MEDICINE

## 2025-07-09 PROCEDURE — 99214 OFFICE O/P EST MOD 30 MIN: CPT | Performed by: INTERNAL MEDICINE

## 2025-07-09 PROCEDURE — 1159F MED LIST DOCD IN RCRD: CPT | Performed by: INTERNAL MEDICINE

## 2025-07-09 PROCEDURE — 3078F DIAST BP <80 MM HG: CPT | Performed by: INTERNAL MEDICINE

## 2025-07-09 PROCEDURE — 3008F BODY MASS INDEX DOCD: CPT | Performed by: INTERNAL MEDICINE

## 2025-07-09 NOTE — PROGRESS NOTES
"Chief Complaint   Patient presents with    Follow-up     6 monthCoronary artery disease of native artery of native heart with stable angina pectoris       Mckenzie Jones is a 74 y.o. male     74-year-old gentleman returns for annual cardiovascular follow-up and is doing well, he denies any cardiovascular events, complaints or nitrate usage or hospitalizations    Most recent labs are reviewed revealing a high-sensitivity CRP of 3.5, LDL level of 21    He has known ASHD with multivessel revascularization of the LAD, proximal RCA performed by myself prior to 2019 subsequently in 2019, had ACS event and underwent PCI of the PLV branch; at that time LAD and RCA stents were widely patent.  Notably he has subtotal occlusion distal circumflex that we have avoided, and treated conservatively (this is likely is his culprit vessel).    This past January 2025 he underwent stress perfusion imaging that was completely normal at 8.1 METS on the Cooper protocol, with normal perfusion scan and ejection fraction of 59%    We counseled him on diabetic control, and reduction of inflammation as his main goals for prevention; will follow-up again in 1 year on same therapy         Review of Systems   All other systems reviewed and are negative.           Vitals:    07/09/25 1004   BP: 112/51   BP Location: Left arm   Patient Position: Sitting   Pulse: 60   Weight: 84.4 kg (186 lb)   Height: 1.727 m (5' 8\")        Objective   Physical Exam  Constitutional:       Appearance: Normal appearance.   HENT:      Nose: Nose normal.   Neck:      Vascular: No carotid bruit.   Cardiovascular:      Rate and Rhythm: Normal rate.      Pulses: Normal pulses.      Heart sounds: Normal heart sounds.   Pulmonary:      Effort: Pulmonary effort is normal.   Abdominal:      General: Bowel sounds are normal.      Palpations: Abdomen is soft.   Musculoskeletal:         General: Normal range of motion.      Cervical back: Normal range of motion.      " Right lower leg: No edema.      Left lower leg: No edema.   Skin:     General: Skin is warm and dry.   Neurological:      General: No focal deficit present.      Mental Status: He is alert.   Psychiatric:         Mood and Affect: Mood normal.         Behavior: Behavior normal.         Thought Content: Thought content normal.         Judgment: Judgment normal.         Allergies  Pantoprazole and Terbinafine     Current Medications  Current Outpatient Medications   Medication Instructions    aspirin 81 mg, Daily    cloNIDine (CATAPRES) 0.3 mg, Nightly    clopidogrel (PLAVIX) 75 mg, Daily    cyanocobalamin (VITAMIN B-12) 500 mcg, Daily    dapagliflozin propanediol (FARXIGA) 10 mg, Daily    ezetimibe (ZETIA) 10 mg, oral, Nightly    insulin glargine (Basaglar KwikPen U-100 Insulin) 100 unit/mL (3 mL) pen Nightly    isosorbide mononitrate ER (IMDUR) 30 mg, oral, Daily, Do not crush or chew.    labetalol (NORMODYNE) 300 mg, 2 times daily    lisinopriL-hydrochlorothiazide 10-12.5 mg tablet 1 tablet, 2 times daily    NIFEdipine ER (ADALAT CC) 30 mg, Daily    nitroglycerin (NITROSTAT) 0.4 mg, Every 5 min PRN    omega 3-dha-epa-fish oil (Fish OiL) 1,200 (144-216) mg capsule Take by mouth.    polyethylene glycol (GLYCOLAX, MIRALAX) 17 g, Daily    rosuvastatin (CRESTOR) 5 mg, oral, Nightly                        Assessment/Plan   1. Coronary artery disease of native artery of native heart with stable angina pectoris  Follow Up In Cardiology      2. Essential hypertension        3. History of PTCA        4. Mixed hyperlipidemia        5. Paroxysmal atrial fibrillation (Multi)        6. Angina, class II        7. Type 2 diabetes mellitus without complication, unspecified whether long term insulin use        8. Former cigarette smoker        9. BMI 28.0-28.9,adult                 Scribe Attestation  By signing my name below, Dixie NGUYEN RN   , Scribe   attest that this documentation has been prepared under the direction and in  the presence of Martinez Santamaria DO.     Provider Attestation - Scribe documentation    All medical record entries made by the Scribe were at my direction and personally dictated by me. I have reviewed the chart and agree that the record accurately reflects my personal performance of the history, physical exam, discussion and plan.

## 2025-07-09 NOTE — LETTER
July 9, 2025     Jad Davis MD  [address]  [facility] [address]    Patient: Abdelrahman Jones   YOB: 1950   Date of Visit: 7/9/2025       Dear Dr. Jad Davis MD:    Thank you for referring Abdelrahman Jones to me for evaluation. Below are my notes for this consultation.  If you have questions, please do not hesitate to call me. I look forward to following your patient along with you.       Sincerely,     Martinez Santamaria, DO      CC: No Recipients  ______________________________________________________________________________________    Chief Complaint   Patient presents with   • Follow-up     6 monthCoronary artery disease of native artery of native heart with stable angina pectoris       Subjective   Abdelrahman Jones is a 74 y.o. male     74-year-old gentleman returns for annual cardiovascular follow-up and is doing well, he denies any cardiovascular events, complaints or nitrate usage or hospitalizations    Most recent labs are reviewed revealing a high-sensitivity CRP of 3.5, LDL level of 21    He has known ASHD with multivessel revascularization of the LAD, proximal RCA performed by myself prior to 2019 subsequently in 2019, had ACS event and underwent PCI of the PLV branch; at that time LAD and RCA stents were widely patent.  Notably he has subtotal occlusion distal circumflex that we have avoided, and treated conservatively (this is likely is his culprit vessel).    This past January 2025 he underwent stress perfusion imaging that was completely normal at 8.1 METS on the Cooper protocol, with normal perfusion scan and ejection fraction of 59%    We counseled him on diabetic control, and reduction of inflammation as his main goals for prevention; will follow-up again in 1 year on same therapy         Review of Systems   All other systems reviewed and are negative.           Vitals:    07/09/25 1004   BP: 112/51   BP Location: Left arm   Patient Position: Sitting   Pulse: 60  "  Weight: 84.4 kg (186 lb)   Height: 1.727 m (5' 8\")        Objective   Physical Exam  Constitutional:       Appearance: Normal appearance.   HENT:      Nose: Nose normal.   Neck:      Vascular: No carotid bruit.   Cardiovascular:      Rate and Rhythm: Normal rate.      Pulses: Normal pulses.      Heart sounds: Normal heart sounds.   Pulmonary:      Effort: Pulmonary effort is normal.   Abdominal:      General: Bowel sounds are normal.      Palpations: Abdomen is soft.   Musculoskeletal:         General: Normal range of motion.      Cervical back: Normal range of motion.      Right lower leg: No edema.      Left lower leg: No edema.   Skin:     General: Skin is warm and dry.   Neurological:      General: No focal deficit present.      Mental Status: He is alert.   Psychiatric:         Mood and Affect: Mood normal.         Behavior: Behavior normal.         Thought Content: Thought content normal.         Judgment: Judgment normal.         Allergies  Pantoprazole and Terbinafine     Current Medications  Current Outpatient Medications   Medication Instructions   • aspirin 81 mg, Daily   • cloNIDine (CATAPRES) 0.3 mg, Nightly   • clopidogrel (PLAVIX) 75 mg, Daily   • cyanocobalamin (VITAMIN B-12) 500 mcg, Daily   • dapagliflozin propanediol (FARXIGA) 10 mg, Daily   • ezetimibe (ZETIA) 10 mg, oral, Nightly   • insulin glargine (Basaglar KwikPen U-100 Insulin) 100 unit/mL (3 mL) pen Nightly   • isosorbide mononitrate ER (IMDUR) 30 mg, oral, Daily, Do not crush or chew.   • labetalol (NORMODYNE) 300 mg, 2 times daily   • lisinopriL-hydrochlorothiazide 10-12.5 mg tablet 1 tablet, 2 times daily   • NIFEdipine ER (ADALAT CC) 30 mg, Daily   • nitroglycerin (NITROSTAT) 0.4 mg, Every 5 min PRN   • omega 3-dha-epa-fish oil (Fish OiL) 1,200 (144-216) mg capsule Take by mouth.   • polyethylene glycol (GLYCOLAX, MIRALAX) 17 g, Daily   • rosuvastatin (CRESTOR) 5 mg, oral, Nightly                        Assessment/Plan   1. Coronary " artery disease of native artery of native heart with stable angina pectoris  Follow Up In Cardiology      2. Essential hypertension        3. History of PTCA        4. Mixed hyperlipidemia        5. Paroxysmal atrial fibrillation (Multi)        6. Angina, class II        7. Type 2 diabetes mellitus without complication, unspecified whether long term insulin use        8. Former cigarette smoker        9. BMI 28.0-28.9,adult                 Scribe Attestation  By signing my name below, I, Dixie REYES RN   , Scribe   attest that this documentation has been prepared under the direction and in the presence of Martinez Santamaria DO.     Provider Attestation - Scribe documentation    All medical record entries made by the Scribe were at my direction and personally dictated by me. I have reviewed the chart and agree that the record accurately reflects my personal performance of the history, physical exam, discussion and plan.

## 2025-07-09 NOTE — PATIENT INSTRUCTIONS
Please bring all medicines, vitamins, and herbal supplements with you when you come to the office.    Prescriptions will not be filled unless you are compliant with your follow up appointments or have a follow up appointment scheduled as per instruction of your physician. Refills should be requested at the time of your visit. Fall Prevention Education Given     BMI was above normal measurement. Current weight: 84.4 kg (186 lb)  Weight change since last visit (-) denotes wt loss 1.4 lbs   Weight loss needed to achieve BMI 25: 21.9 Lbs  Weight loss needed to achieve BMI 30: -10.9 Lbs  Provided instructions on dietary changes  Provided instructions on exercise.

## 2026-07-15 ENCOUNTER — APPOINTMENT (OUTPATIENT)
Dept: CARDIOLOGY | Facility: CLINIC | Age: 76
End: 2026-07-15
Payer: COMMERCIAL